# Patient Record
Sex: FEMALE | Race: WHITE | URBAN - METROPOLITAN AREA
[De-identification: names, ages, dates, MRNs, and addresses within clinical notes are randomized per-mention and may not be internally consistent; named-entity substitution may affect disease eponyms.]

---

## 2017-01-02 ENCOUNTER — TELEPHONE (OUTPATIENT)
Dept: FAMILY MEDICINE | Facility: CLINIC | Age: 27
End: 2017-01-02

## 2017-01-02 NOTE — TELEPHONE ENCOUNTER
1/2/2017    Call Regarding Preventive Health Screening Cervical/PAP    Attempt 1    Message on voicemail     Comments:       Outreach   KV

## 2017-01-09 NOTE — TELEPHONE ENCOUNTER
01/09/17      Call Regarding Preventive Health Screening Cervical/PAP    Attempt 2    Message on voicemail     Comments:       Outreach   cnt

## 2017-01-19 NOTE — TELEPHONE ENCOUNTER
1/19/2017    Call Regarding Preventive Health Screening Cervical/PAP and PHYSICAL    Attempt 3    Message SPOKE WITH PATIENT    Comments: WILL CALL ON OWN TIME      Outreach   CC

## 2017-09-17 ENCOUNTER — HEALTH MAINTENANCE LETTER (OUTPATIENT)
Age: 27
End: 2017-09-17

## 2019-11-09 ENCOUNTER — HEALTH MAINTENANCE LETTER (OUTPATIENT)
Age: 29
End: 2019-11-09

## 2020-02-17 ENCOUNTER — OFFICE VISIT (OUTPATIENT)
Dept: FAMILY MEDICINE | Facility: CLINIC | Age: 30
End: 2020-02-17

## 2020-02-17 VITALS
SYSTOLIC BLOOD PRESSURE: 119 MMHG | BODY MASS INDEX: 22.36 KG/M2 | WEIGHT: 134.38 LBS | OXYGEN SATURATION: 99 % | HEART RATE: 74 BPM | TEMPERATURE: 98.9 F | DIASTOLIC BLOOD PRESSURE: 74 MMHG

## 2020-02-17 DIAGNOSIS — R59.9 SWELLING OF LYMPH NODES: Primary | ICD-10-CM

## 2020-02-17 LAB
BASOPHILS # BLD AUTO: 0 10E9/L (ref 0–0.2)
BASOPHILS NFR BLD AUTO: 0.2 %
DIFFERENTIAL METHOD BLD: NORMAL
EOSINOPHIL # BLD AUTO: 0.2 10E9/L (ref 0–0.7)
EOSINOPHIL NFR BLD AUTO: 2.6 %
ERYTHROCYTE [DISTWIDTH] IN BLOOD BY AUTOMATED COUNT: 12.8 % (ref 10–15)
HCT VFR BLD AUTO: 44.2 % (ref 35–47)
HETEROPH AB SER QL: NEGATIVE
HGB BLD-MCNC: 14.2 G/DL (ref 11.7–15.7)
LYMPHOCYTES # BLD AUTO: 2.3 10E9/L (ref 0.8–5.3)
LYMPHOCYTES NFR BLD AUTO: 37 %
MCH RBC QN AUTO: 30.1 PG (ref 26.5–33)
MCHC RBC AUTO-ENTMCNC: 32.1 G/DL (ref 31.5–36.5)
MCV RBC AUTO: 94 FL (ref 78–100)
MONOCYTES # BLD AUTO: 0.5 10E9/L (ref 0–1.3)
MONOCYTES NFR BLD AUTO: 7.6 %
NEUTROPHILS # BLD AUTO: 3.3 10E9/L (ref 1.6–8.3)
NEUTROPHILS NFR BLD AUTO: 52.6 %
PLATELET # BLD AUTO: 291 10E9/L (ref 150–450)
RBC # BLD AUTO: 4.72 10E12/L (ref 3.8–5.2)
WBC # BLD AUTO: 6.2 10E9/L (ref 4–11)

## 2020-02-17 PROCEDURE — 85025 COMPLETE CBC W/AUTO DIFF WBC: CPT | Performed by: NURSE PRACTITIONER

## 2020-02-17 PROCEDURE — 99203 OFFICE O/P NEW LOW 30 MIN: CPT | Performed by: NURSE PRACTITIONER

## 2020-02-17 PROCEDURE — 36415 COLL VENOUS BLD VENIPUNCTURE: CPT | Performed by: NURSE PRACTITIONER

## 2020-02-17 PROCEDURE — 86308 HETEROPHILE ANTIBODY SCREEN: CPT | Performed by: NURSE PRACTITIONER

## 2020-02-17 RX ORDER — NORETHINDRONE ACETATE AND ETHINYL ESTRADIOL AND FERROUS FUMARATE 5-7-9-7
1 KIT ORAL DAILY
COMMUNITY
End: 2020-02-17

## 2020-02-17 NOTE — PROGRESS NOTES
History of Present Illness  Sasha Kunz I s a 30 yo female who presents with a couple weeks ago developed swollen lymph node on left side 2 days ago. It was initially really swollen and painful. Has gone down in size and tenderness.    She stated that this has happened before but she developed lumps in her arm pits that went away on their own.   Denies cough sore throat cold symptoms fever night sweats.     History reviewed. No pertinent past medical history.  Current Outpatient Medications   Medication     omega-3 acid ethyl esters (LOVAZA) 1 G capsule     No current facility-administered medications for this visit.         Allergies   Allergen Reactions     Penicillins      Rash as a child     Family History   Problem Relation Age of Onset     Family History Negative Mother      Lipids Father      Psychotic Disorder Father      Diabetes Maternal Grandfather        Review Of Systems    Allergy:Negative  Ears/Nose/Throat: negative  Respiratory: negative  Eyes: negative  Gastrointestinal:negative  Neurologic:negative  General:negative  Endocrine:  negative  Hematologic/Lymphatic/Immunologic: swollen glands  Cardiac:  Negative,  Musculoskeletal:    Negative, :   Negative, Skin:    Negative and Psych:    Negative    /74   Pulse 74   Temp 98.9  F (37.2  C) (Tympanic)   Wt 61 kg (134 lb 6 oz)   SpO2 99%   BMI 22.36 kg/m    GEN: Alert no acute distress  Right Ear:  Auricle is normal, Skin without lesion and Tympanic membrane clear, full, mobile  Left Ear:  Auricle is normal, Skin without lesion and Tympanic membrane clear, full, mobile  Nasal Exam:  normal  Oral Cavity:  Voice normal     Oropharynx: normal                     Neck Exam:      Superficial cervical gland - left:  Slightly enlarged, tender. No overlying redness no masses felt  CV: S1 and S2 normal, no murmurs, clicks, gallops or rubs. Regular rate and rhythm.   Lungs: Chest is clear; no wheezes or rales. No edema or JVD.  Skin: no rash  Abdomen:  is soft without tenderness, guarding, mass, rebound or organomegaly. Bowel sounds are normal. No CVA tenderness or inguinal adenopathy noted.      Plan    (R59.9) Swelling of lymph nodes  (primary encounter diagnosis)    Plan:   CBC and mono pending  We discussed this is very common. Sounds like it is already improving.   Patient education given on supportive cares  If new or worsening consider imaging or antibiotic as needed  Follow up in 3-4 weeks to recheck enlarged nodes, sooner if worsening        Makenzie Orozco, APRN CNP

## 2020-02-17 NOTE — PATIENT INSTRUCTIONS
Patient Education     Lymphadenopathy  Lymphadenopathy is swelling of the lymph nodes. Lymph nodes are small, bean-shaped glands around the body.  What are lymph nodes?  Lymph nodes are part of your immune system. These glands are found in your neck, over your clavicle, armpits, groin, chest, and abdomen. They act as filters for lymph fluid as it flows through your body. Lymph fluid contains white blood cells (lymphocytes) that help the body fight infection and disease.   Why lymph nodes swell  Lymphadenopathy is very common. The glands often enlarge during a viral or bacterial infection. It can happen during a cold, the flu, or strep throat. The nodes may swell in just one area of the body, such as the neck (localized). Or nodes may swell all over the body (generalized). The neck (cervical) lymph nodes are the most common site of lymphadenopathy.  What causes lymphadenopathy?  Dead cells and fluid build up in the lymph nodes as they help fight infection or disease. This causes them to swell in size. Enlarged lymph nodes are often near the source of infection. This can help to find the cause of an infection. For example, swollen lymph nodes around the jaw may be because of an infection in the teeth or mouth. But lymphadenopathy may also be generalized. This is common in some viral illnesses such as infectious mononucleosis, HIV or chickenpox (varicella).  Lymphadenopathy can also be caused by:    Infection of a lymph node or small group of nodes (lymphadenitis)    Cancer    Reactions to medicines such as antibiotics and certain blood pressure, gout, and seizure medicines    Other health conditions, such as lupus or sarcoidosis  Symptoms of lymphadenopathy  Lymphadenopathy can cause symptoms such as:    Lumps under the jaw, on the sides or back of the neck, in the armpits, in the groin, or in the chest or belly (abdomen)    Pain or tenderness in any of these areas    Redness or warmth in any of these areas  You may  also have symptoms from an infection causing the swollen glands. These symptoms may include fever, sore throat, body aches, or cough.  Diagnosing lymphadenopathy  Your healthcare provider will ask about your health history and symptoms. He or she will give you a physical exam and check the areas where lymph nodes are enlarged. Your healthcare provider will check the size. texture, and location of the nodes, and ask how long they have been swollen and if they are painful. Diagnostic tests and referral to specialists may be recommended. They may include:    Blood tests. These are done to check for signs of infection and other problems.    Urine test. This is also done to check for infection and other problems.    Chest X-ray, ultrasound, CT scan, or MRI scans. These tests can show enlarged lymph nodes or other problems.    Lymph node biopsy. The cause of enlarged lymph nodes may be checked with a biopsy. Small samples of lymph node tissue are taken and checked in a lab for signs of infection, cancer and other causes. You may be referred to other specialistsfor their opinion as well.  Treatment for lymphadenopathy  The treatment of enlarged lymph nodes depends on the cause. Enlarged lymph nodes are often harmless and go away without any treatment. Treatment is most often done on the cause of the enlarged nodes and may include:    Antibiotic or antiviral medicine to treat a bacterial or viral infection    Incision and drainage of a lymph node for lymphadenitis    Other medicines or procedures to treat the cause of the enlarged nodes  You may need a follow-up exam in 3 to 4 weeks to recheck enlarged nodes.  When to call your healthcare provider  Call your healthcare provider if your symptoms worsen, you develop new symptoms, you have a fever of 100.4 F (38 C) or higher, lymph nodes that are still swollen after 3 to 4 weeks, or as directed by your healthcare provider.  Date Last Reviewed: 5/1/2017 2000-2019 [employer]  [employer]. [address] [place] [address], [place] [address]. All rights reserved. This information is not intended as a substitute for professional medical care. Always follow your healthcare professional's instructions.

## 2020-02-23 ENCOUNTER — HEALTH MAINTENANCE LETTER (OUTPATIENT)
Age: 30
End: 2020-02-23

## 2020-12-06 ENCOUNTER — HEALTH MAINTENANCE LETTER (OUTPATIENT)
Age: 30
End: 2020-12-06

## 2021-09-26 ENCOUNTER — HEALTH MAINTENANCE LETTER (OUTPATIENT)
Age: 31
End: 2021-09-26

## 2022-01-15 ENCOUNTER — HEALTH MAINTENANCE LETTER (OUTPATIENT)
Age: 32
End: 2022-01-15

## 2023-04-23 ENCOUNTER — HEALTH MAINTENANCE LETTER (OUTPATIENT)
Age: 33
End: 2023-04-23